# Patient Record
Sex: MALE | Race: BLACK OR AFRICAN AMERICAN | NOT HISPANIC OR LATINO | URBAN - METROPOLITAN AREA
[De-identification: names, ages, dates, MRNs, and addresses within clinical notes are randomized per-mention and may not be internally consistent; named-entity substitution may affect disease eponyms.]

---

## 2024-09-24 ENCOUNTER — EMERGENCY (EMERGENCY)
Facility: HOSPITAL | Age: 49
LOS: 1 days | Discharge: HOME | End: 2024-09-24
Attending: EMERGENCY MEDICINE | Admitting: EMERGENCY MEDICINE
Payer: OTHER MISCELLANEOUS

## 2024-09-24 VITALS
WEIGHT: 199.96 LBS | RESPIRATION RATE: 18 BRPM | OXYGEN SATURATION: 100 % | DIASTOLIC BLOOD PRESSURE: 92 MMHG | HEART RATE: 85 BPM | TEMPERATURE: 98 F | SYSTOLIC BLOOD PRESSURE: 156 MMHG

## 2024-09-24 PROCEDURE — 99284 EMERGENCY DEPT VISIT MOD MDM: CPT

## 2024-09-24 PROCEDURE — 99053 MED SERV 10PM-8AM 24 HR FAC: CPT

## 2024-09-24 RX ORDER — OXYCODONE HYDROCHLORIDE 5 MG/1
10 TABLET ORAL ONCE
Refills: 0 | Status: DISCONTINUED | OUTPATIENT
Start: 2024-09-24 | End: 2024-09-24

## 2024-09-24 RX ORDER — OXYCODONE HYDROCHLORIDE 5 MG/1
1 TABLET ORAL
Qty: 9 | Refills: 0
Start: 2024-09-24 | End: 2024-09-26

## 2024-09-24 RX ADMIN — OXYCODONE HYDROCHLORIDE 10 MILLIGRAM(S): 5 TABLET ORAL at 02:19

## 2024-09-24 NOTE — ED PROVIDER NOTE - NSFOLLOWUPINSTRUCTIONS_ED_ALL_ED_FT
You were seen in the ED today for chest wall pain.     Your symptoms are likely due to known rib fractures.     Please follow up with your PCP within the next 1 week.     A prescription for OXYCODONE was sent to your pharmacy. Please take the medication as prescribed.    If you experience any of the following please return to the ED:  - Chest pain  - Trouble breathing  - New or worsening pain  - Passing out  - New trauma to the area

## 2024-09-24 NOTE — ED PROVIDER NOTE - ATTENDING CONTRIBUTION TO CARE
48-year-old male with no previous past medical history presents the emergency department for right-sided rib pain.  Patient states he fell off shelving about 7 feet in the air landing on his right side of his chest at a construction site.  Patient went to [facility] and had all trauma scans that showed nondisplaced rib fractures on the right side 6 and 7.  Patient was prescribed multiple medications and an incentive spirometer.  Patient states the pharmacy was unable to fill the prescription for the oxycodone as the hospitals electronic submission was not working and they were unable to call in the prescription.  Patient denies headache, vision changes, neck or back pain, difficulty breathing, numbness or tingling in extremities.    Patient is well-appearing speaking full sentences right flank with ecchymosis diffusely tenderness to palpation otherwise gait is steady and stable    48-year-old man no previous medical history that had a right sided rib pain after a fall.  Went to [facility] in [place] and had medications prescribed but was unable to pick the medication up now with pain.  At this time CT that he has with him shows only fractures of ribs 6 and 7 nondisplaced but no other intra-abdominal pathology.  Likely pain not well-controlled no clinical indication for labs or imaging likely not internal bleeding since incident happened 2 days ago and had no internal organ damage.  At this time will provide pain medications and most likely discharge with pain medications as patient states that the other hospitals prescription writer was down so he was not able to call and narcotics.  At this time will provide patient with narcotic pain medications as his pain is not well-controlled with over-the-counter medications and likely discharge if pain is improved.  Stressed the importance of continuing to use incentive spirometer and follow-up with PMD as an outpatient.

## 2024-09-24 NOTE — ED PROVIDER NOTE - CLINICAL SUMMARY MEDICAL DECISION MAKING FREE TEXT BOX
48-year-old male with no previous past medical history presents the emergency department for right-sided rib pain. Pt had fall 2 days ago. Seen at [facility] and had CTs that showed right non-displaced rib fx of 6 and 7. Pt taking Motrin and using incentive spirometer. Pt endorsing pain at site of rib fx. Unable to get prescription for Percocet due to prescription transmission issue at OSH. Pt in no acute distress. TTP and ecchymosis over site of known rib fxs. Plan for analgesia and reassessment. Dispo  likely home.

## 2024-09-24 NOTE — ED PROVIDER NOTE - PHYSICAL EXAMINATION
Constitutional: VS reviewed. Alert and orientedx3, well appearing, no apparent distress  HEENT: Atraumatic, EOMI  CV: RRR, + TTP over right lateral ribs w/ some overlying ecchymosis  Lungs: Clear and equal bilaterally, no wheezes, rales or crackles.   Abdomen: Soft, nondistended, nontender  MSK: No deformities  Skin: Warm and dry. + right lateral chest wall ecchymosis  Neuro: Moving all extremities.   Lymph: No pitting edema in extremities.

## 2024-09-24 NOTE — ED ADULT NURSE NOTE - NSFALLUNIVINTERV_ED_ALL_ED
Bed/Stretcher in lowest position, wheels locked, appropriate side rails in place/Call bell, personal items and telephone in reach/Instruct patient to call for assistance before getting out of bed/chair/stretcher/Non-slip footwear applied when patient is off stretcher/Speed to call system/Physically safe environment - no spills, clutter or unnecessary equipment/Purposeful proactive rounding/Room/bathroom lighting operational, light cord in reach

## 2024-09-24 NOTE — ED ADULT NURSE NOTE - OBJECTIVE STATEMENT
Break RN: 47yo male received in room trauma C. pt A&Ox4, denies pmhx, ambulatory, c/o right sided rib pain. Pt fell at work about 7 ft landing on his right chest, had xray done was told has 2 rib fracture on right side. Has not been able to fill his prescription for oxycodone. Medicated as per ordered. Side rails up, bed at lowest position, call bell within reach, patient oriented to the unit, safety maintained.

## 2024-09-24 NOTE — ED PROVIDER NOTE - OBJECTIVE STATEMENT
48-year-old male with no previous past medical history presents the emergency department for right-sided rib pain.  Patient states he fell off shelving about 7 feet in the air landing on his right side of his chest at a construction site.  Patient went to [facility] and had all trauma scans that showed nondisplaced rib fractures on the right side 6 and 7.  Patient was prescribed multiple medications and an incentive spirometer.  Patient states the pharmacy was unable to fill the prescription for the oxycodone as the hospitals electronic submission was not working and they were unable to call in the prescription.  Patient denies headache, vision changes, neck or back pain, difficulty breathing, numbness or tingling in extremities.

## 2024-09-24 NOTE — ED ADULT TRIAGE NOTE - CHIEF COMPLAINT QUOTE
Pt c/o right sided rib pain s/p falling when a construction platform collapsed at work 2 days ago. States platform was a few feet in the air.  Denies LOC, head trauma or AC use. Denies Hx

## 2024-09-24 NOTE — ED PROVIDER NOTE - PATIENT PORTAL LINK FT
You can access the FollowMyHealth Patient Portal offered by [facility] by registering at the following website: [email or web address]. By joining [facility]’s FollowMyHealth portal, you will also be able to view your health information using other applications (apps) compatible with our system.

## 2024-09-24 NOTE — ED PROVIDER NOTE - PROGRESS NOTE DETAILS
POLO: Patient's pain was improved with medications wife is here to bring patient home.  Stressed importance of incentive spirometer use and outpatient follow-up.  Continue to use over-the-counter medications prior to narcotic use but if needs secondary to continued pain despite over-the-counter medications can use narcotics but be wary about drowsiness which patient was made aware.  Also over-the-counter lidocaine patches can be used as well.  Return precautions explained and understood.